# Patient Record
Sex: MALE | Race: WHITE
[De-identification: names, ages, dates, MRNs, and addresses within clinical notes are randomized per-mention and may not be internally consistent; named-entity substitution may affect disease eponyms.]

---

## 2021-01-11 ENCOUNTER — HOSPITAL ENCOUNTER (EMERGENCY)
Dept: HOSPITAL 8 - ED | Age: 26
Discharge: HOME | End: 2021-01-11
Payer: MEDICAID

## 2021-01-11 VITALS — WEIGHT: 152.78 LBS | HEIGHT: 70 IN | BODY MASS INDEX: 21.87 KG/M2

## 2021-01-11 VITALS — DIASTOLIC BLOOD PRESSURE: 95 MMHG | SYSTOLIC BLOOD PRESSURE: 133 MMHG

## 2021-01-11 DIAGNOSIS — L03.116: Primary | ICD-10-CM

## 2021-01-11 PROCEDURE — 99283 EMERGENCY DEPT VISIT LOW MDM: CPT

## 2021-01-11 NOTE — NUR
CC OF SPIDER BITE TO LEFT LOWER LEG. PT STATES HE WAS BITE 24 HRS AGO AND WAS 
SEEN AT [facility] BUT NO REDDNESS OR SWELLING WAS PRESENT AT THAT TIME. PT NOW HAS 
REDDNESS ON FRONT SIDE OF SHIN AND STATES IT IS PAINFUL. REDDNESS OUTLINES WITH 
MARKER.